# Patient Record
Sex: FEMALE
[De-identification: names, ages, dates, MRNs, and addresses within clinical notes are randomized per-mention and may not be internally consistent; named-entity substitution may affect disease eponyms.]

---

## 2021-08-09 ENCOUNTER — DASHBOARD ENCOUNTERS (OUTPATIENT)
Age: 31
End: 2021-08-09

## 2021-08-09 ENCOUNTER — OFFICE VISIT (OUTPATIENT)
Dept: URBAN - METROPOLITAN AREA CLINIC 113 | Facility: CLINIC | Age: 31
End: 2021-08-09

## 2021-08-09 ENCOUNTER — LAB OUTSIDE AN ENCOUNTER (OUTPATIENT)
Dept: URBAN - METROPOLITAN AREA CLINIC 113 | Facility: CLINIC | Age: 31
End: 2021-08-09

## 2021-08-09 VITALS
BODY MASS INDEX: 23.05 KG/M2 | HEART RATE: 78 BPM | DIASTOLIC BLOOD PRESSURE: 67 MMHG | HEIGHT: 70 IN | WEIGHT: 161 LBS | SYSTOLIC BLOOD PRESSURE: 108 MMHG | TEMPERATURE: 98.4 F

## 2021-08-09 DIAGNOSIS — K59.01 SLOW TRANSIT CONSTIPATION: ICD-10-CM

## 2021-08-09 DIAGNOSIS — R53.83 OTHER FATIGUE: ICD-10-CM

## 2021-08-09 DIAGNOSIS — R11.0 NAUSEA WITHOUT VOMITING: ICD-10-CM

## 2021-08-09 DIAGNOSIS — K62.5 BRIGHT RED BLOOD PER RECTUM: ICD-10-CM

## 2021-08-09 PROCEDURE — 99203 OFFICE O/P NEW LOW 30 MIN: CPT | Performed by: INTERNAL MEDICINE

## 2021-08-09 RX ORDER — SODIUM, POTASSIUM,MAG SULFATES 17.5-3.13G
354 ML SOLUTION, RECONSTITUTED, ORAL ORAL ONCE
Qty: 354 ML | Refills: 0 | OUTPATIENT
Start: 2021-08-09 | End: 2021-08-10

## 2021-08-09 RX ORDER — MULTIVITAMIN
1 TABLET TABLET ORAL ONCE A DAY
Status: ACTIVE | COMMUNITY

## 2021-08-09 RX ORDER — ACETAMINOPHEN 500 MG
1 CAPSULE TABLET ORAL TWICE A DAY
Status: ACTIVE | COMMUNITY

## 2021-08-09 NOTE — HPI-TODAY'S VISIT:
Ms. Dveine is a 30-year-old woman with no chronic medical conditions, presenting for evaluation regarding constipation and bright red blood per rectum.  She reports a long-term history of chronic constipation.  About 6 months ago she noticed her symptoms were progressively worsening.  It was at that time she also began experiencing rectal bleeding with bowel movements.  She is having small-volume, firm bowel movements every 3 days with associated bright red blood per rectum. Denies dark, black stools. She reports associated nausea and increasing fatigue. She recently adopted a pescatarian diet and questions if this may be contributing to her worsening fatigue. She denies chest pain or dyspnea on exertion. She has occasional periumbilical and lower abdominal pain that is typically improved with defecation. She endorses a 5-7lb weight loss over the past 6 months. Denies any upper GI complaints of heartburn/acid reflux, dysphagia, or regurgitation.  She has never had a colonoscopy in the past. There is no known daily history of colon cancer, advanced colon polyps, or inflammatory bowel disease.

## 2021-08-09 NOTE — PHYSICAL EXAM GASTROINTESTINAL
Abdomen,  soft, mild lower abdominal tenderness with palpation. Questionable periumbilical hernia or lipoma that is slightly tender to palpation. , nondistended,  no guarding or rigidity,  no masses palpable,  normal bowel sounds

## 2021-08-09 NOTE — PHYSICAL EXAM RECTAL:
No evidence of external hemorrhoids. There is a small-hard oval shaped area noted at the 9 o'clock position perianally. The lesion is flesh colored and regular in shape. There is no tenderness to palpation of the area. Internal exam is negative for palpable masses. Normal sphincter tone. No tenderness noted. No overt bleeding on exam.

## 2021-08-14 PROBLEM — 84229001: Status: ACTIVE | Noted: 2021-08-09

## 2021-08-14 PROBLEM — 35298007: Status: ACTIVE | Noted: 2021-08-09

## 2021-08-14 PROBLEM — 422587007: Status: ACTIVE | Noted: 2021-08-09

## 2021-08-14 PROBLEM — 74474003: Status: ACTIVE | Noted: 2021-08-09

## 2021-08-27 ENCOUNTER — OFFICE VISIT (OUTPATIENT)
Dept: URBAN - METROPOLITAN AREA SURGERY CENTER 25 | Facility: SURGERY CENTER | Age: 31
End: 2021-08-27

## 2021-09-29 ENCOUNTER — OFFICE VISIT (OUTPATIENT)
Dept: URBAN - METROPOLITAN AREA MEDICAL CENTER 2 | Facility: MEDICAL CENTER | Age: 31
End: 2021-09-29